# Patient Record
Sex: MALE | Race: WHITE | NOT HISPANIC OR LATINO | Employment: OTHER | URBAN - NONMETROPOLITAN AREA
[De-identification: names, ages, dates, MRNs, and addresses within clinical notes are randomized per-mention and may not be internally consistent; named-entity substitution may affect disease eponyms.]

---

## 2023-12-27 ENCOUNTER — OFFICE VISIT (OUTPATIENT)
Dept: URGENT CARE | Facility: CLINIC | Age: 78
End: 2023-12-27
Payer: OTHER GOVERNMENT

## 2023-12-27 VITALS
OXYGEN SATURATION: 97 % | HEIGHT: 69 IN | RESPIRATION RATE: 18 BRPM | HEART RATE: 93 BPM | SYSTOLIC BLOOD PRESSURE: 130 MMHG | DIASTOLIC BLOOD PRESSURE: 72 MMHG | WEIGHT: 225 LBS | TEMPERATURE: 97.2 F | BODY MASS INDEX: 33.33 KG/M2

## 2023-12-27 DIAGNOSIS — S61.412A LACERATION OF LEFT HAND WITHOUT FOREIGN BODY, INITIAL ENCOUNTER: Primary | ICD-10-CM

## 2023-12-27 PROCEDURE — 90715 TDAP VACCINE 7 YRS/> IM: CPT | Performed by: NURSE PRACTITIONER

## 2023-12-27 PROCEDURE — 99213 OFFICE O/P EST LOW 20 MIN: CPT | Mod: 25 | Performed by: NURSE PRACTITIONER

## 2023-12-27 RX ORDER — ATORVASTATIN CALCIUM 20 MG/1
20 TABLET, FILM COATED ORAL NIGHTLY
COMMUNITY

## 2023-12-27 RX ORDER — GLYBURIDE 5 MG/1
5 TABLET ORAL
COMMUNITY

## 2023-12-27 RX ORDER — ALLOPURINOL 300 MG/1
300 TABLET ORAL
COMMUNITY

## 2023-12-27 RX ORDER — ASPIRIN 81 MG/1
81 TABLET ORAL
COMMUNITY

## 2023-12-27 RX ORDER — LEVOTHYROXINE SODIUM 125 UG/1
125 TABLET ORAL
COMMUNITY

## 2023-12-27 RX ORDER — BENAZEPRIL HYDROCHLORIDE AND HYDROCHLOROTHIAZIDE 20; 12.5 MG/1; MG/1
1 TABLET ORAL
COMMUNITY

## 2023-12-27 NOTE — PROGRESS NOTES
78 y.o. male presents for evaluation of left hand laceration/skin tear that occurred last night when he tripped on carpet. Denies hitting head/loc, hand pain or any other associated symptoms. Is not up to date on tetanus. Did wash with water but no soap. Not on blood thinners.      Vitals:    12/27/23 0932   Resp: 18   SpO2: 97%       No Known Allergies    Medication Documentation Review Audit       Reviewed by Lisa Webster MA (Medical Assistant) on 12/27/23 at 0938      Medication Order Taking? Sig Documenting Provider Last Dose Status   allopurinol (Zyloprim) 300 mg tablet [id number]  Take 1 tablet (300 mg) by mouth once daily. Historical Provider, MD  Active   aspirin 81 mg EC tablet [id number]  Take 1 tablet (81 mg) by mouth once daily. Historical Provider, MD  Active   atorvastatin (Lipitor) 20 mg tablet [id number]  Take 1 tablet (20 mg) by mouth once daily at bedtime. Historical Provider, MD  Active   benazepriL-hydrochlorothiazide (Lotensin HCT) 20-12.5 mg tablet [id number]  Take 1 tablet by mouth once daily. Historical Provider, MD  Active   glyBURIDE (Diabeta) 5 mg tablet [id number]  Take 1 tablet (5 mg) by mouth. Historical Provider, MD  Active   levothyroxine (Synthroid, Levoxyl) 125 mcg tablet [id number]  Take 1 tablet (125 mcg) by mouth once daily. Historical Provider, MD  Active   lisinopril 10 mg tablet 10 mg, hydroCHLOROthiazide 12.5 mg tablet 12.5 mg [id number]  Take by mouth. Historical Provider, MD  Active   pioglitazone HCl (PIOGLITAZONE ORAL) [id number]  Take by mouth. Historical Provider, MD  Active                    No past medical history on file.    No past surgical history on file.    ROS  See HPI    Physical Exam  Vitals and nursing note reviewed.   Constitutional:       General: He is not in acute distress.     Appearance: Normal appearance. He is not ill-appearing or toxic-appearing.   Skin:     General: Skin is warm and dry.      Findings: Laceration (skin tear to dorsal aspect of left  hand) present.   Neurological:      General: No focal deficit present.      Mental Status: He is alert and oriented to person, place, and time.   Psychiatric:         Mood and Affect: Mood normal.         Behavior: Behavior normal.         Assessment/Plan/MDM  Murtaza was seen today for laceration.  Diagnoses and all orders for this visit:  Laceration of left hand without foreign body, initial encounter (Primary)  -     Tdap vaccine, age 10 years and older  (BOOSTRIX)    Wound cleansed with Betasept normal saline solution, 2 Steri-Strips applied and DSD with Adaptic applied. Advised pt to change dressing daily and leave open to air when resting. Boostrix given in office, pt tolerated well. Patient's clinical presentation is otherwise unremarkable at this time. Patient is discharged with instructions to follow-up with primary care or seek emergency medical attention for worsening symptoms or any new concerns.    Heriberto Marcus, CNP  [facility]  [phone]

## 2024-01-02 ENCOUNTER — TELEPHONE (OUTPATIENT)
Dept: URGENT CARE | Facility: CLINIC | Age: 79
End: 2024-01-02
Payer: OTHER GOVERNMENT

## 2024-01-02 DIAGNOSIS — L03.114 CELLULITIS OF LEFT UPPER EXTREMITY: Primary | ICD-10-CM

## 2024-01-02 RX ORDER — CEPHALEXIN 500 MG/1
500 CAPSULE ORAL 3 TIMES DAILY
Qty: 21 CAPSULE | Refills: 0 | Status: SHIPPED | OUTPATIENT
Start: 2024-01-02 | End: 2024-01-09

## 2024-01-02 NOTE — TELEPHONE ENCOUNTER
Pt returned to office for wound care follow up and noted mild erythema to edges of skin tear. Rx keflex sent to pharmacy. Advised pt to continue wound care as directed and monitor for worsening symptoms of infection.    Diagnoses and all orders for this visit:  Cellulitis of left upper extremity (Primary)  -     cephalexin (Keflex) 500 mg capsule; Take 1 capsule (500 mg) by mouth 3 times a day for 7 days.

## 2024-10-31 ENCOUNTER — HOSPITAL ENCOUNTER (EMERGENCY)
Facility: HOSPITAL | Age: 79
Discharge: HOME | End: 2024-10-31
Attending: EMERGENCY MEDICINE
Payer: OTHER GOVERNMENT

## 2024-10-31 ENCOUNTER — APPOINTMENT (OUTPATIENT)
Dept: VASCULAR MEDICINE | Facility: HOSPITAL | Age: 79
End: 2024-10-31
Payer: OTHER GOVERNMENT

## 2024-10-31 ENCOUNTER — PHARMACY VISIT (OUTPATIENT)
Dept: PHARMACY | Facility: CLINIC | Age: 79
End: 2024-10-31
Payer: COMMERCIAL

## 2024-10-31 VITALS
SYSTOLIC BLOOD PRESSURE: 130 MMHG | HEIGHT: 69 IN | TEMPERATURE: 97.8 F | WEIGHT: 190 LBS | OXYGEN SATURATION: 98 % | HEART RATE: 71 BPM | BODY MASS INDEX: 28.14 KG/M2 | RESPIRATION RATE: 16 BRPM | DIASTOLIC BLOOD PRESSURE: 56 MMHG

## 2024-10-31 DIAGNOSIS — R60.0 LOCALIZED EDEMA: ICD-10-CM

## 2024-10-31 DIAGNOSIS — I82.401 ACUTE DEEP VEIN THROMBOSIS (DVT) OF RIGHT LOWER EXTREMITY, UNSPECIFIED VEIN (MULTI): Primary | ICD-10-CM

## 2024-10-31 PROCEDURE — 93971 EXTREMITY STUDY: CPT

## 2024-10-31 PROCEDURE — RXMED WILLOW AMBULATORY MEDICATION CHARGE

## 2024-10-31 PROCEDURE — 99284 EMERGENCY DEPT VISIT MOD MDM: CPT | Mod: 25

## 2024-10-31 PROCEDURE — 93971 EXTREMITY STUDY: CPT | Performed by: STUDENT IN AN ORGANIZED HEALTH CARE EDUCATION/TRAINING PROGRAM

## 2024-10-31 RX ORDER — LISINOPRIL AND HYDROCHLOROTHIAZIDE 12.5; 2 MG/1; MG/1
1 TABLET ORAL
COMMUNITY
Start: 2024-04-09

## 2024-10-31 ASSESSMENT — COLUMBIA-SUICIDE SEVERITY RATING SCALE - C-SSRS
2. HAVE YOU ACTUALLY HAD ANY THOUGHTS OF KILLING YOURSELF?: NO
1. IN THE PAST MONTH, HAVE YOU WISHED YOU WERE DEAD OR WISHED YOU COULD GO TO SLEEP AND NOT WAKE UP?: NO
6. HAVE YOU EVER DONE ANYTHING, STARTED TO DO ANYTHING, OR PREPARED TO DO ANYTHING TO END YOUR LIFE?: NO

## 2024-10-31 ASSESSMENT — PAIN SCALES - GENERAL
PAINLEVEL_OUTOF10: 0 - NO PAIN
PAINLEVEL_OUTOF10: 0 - NO PAIN

## 2024-10-31 ASSESSMENT — PAIN - FUNCTIONAL ASSESSMENT: PAIN_FUNCTIONAL_ASSESSMENT: 0-10

## 2024-11-13 ENCOUNTER — APPOINTMENT (OUTPATIENT)
Dept: CARDIOLOGY | Facility: CLINIC | Age: 79
End: 2024-11-13
Payer: OTHER GOVERNMENT

## 2024-12-02 ENCOUNTER — TELEPHONE (OUTPATIENT)
Dept: GASTROENTEROLOGY | Facility: CLINIC | Age: 79
End: 2024-12-02
Payer: OTHER GOVERNMENT

## 2024-12-02 NOTE — TELEPHONE ENCOUNTER
12/2-Talked with Dr [facility] about [facility]. [facility] said his blood thinner can not be stopped at this time due to DVT. Dr Sanders agrees. Patient will have to hold off on EGD until he is cleared to be off of blood thinner. Called the [facility](Elsie Delgado) to inform them. She did not answer. I did leave a message on her voicemail. I also called patient to notify him as well. Scanned referral into chart.

## 2025-05-02 DIAGNOSIS — R13.10 DYSPHAGIA, UNSPECIFIED TYPE: ICD-10-CM

## 2025-05-20 ENCOUNTER — HOSPITAL ENCOUNTER (OUTPATIENT)
Dept: OPERATING ROOM | Facility: HOSPITAL | Age: 80
Discharge: HOME | End: 2025-05-20
Payer: OTHER GOVERNMENT

## 2025-05-20 VITALS
RESPIRATION RATE: 16 BRPM | OXYGEN SATURATION: 97 % | SYSTOLIC BLOOD PRESSURE: 142 MMHG | TEMPERATURE: 97.8 F | DIASTOLIC BLOOD PRESSURE: 70 MMHG | HEART RATE: 78 BPM

## 2025-05-20 DIAGNOSIS — Q39.6 ESOPHAGEAL DIVERTICULUM: Primary | ICD-10-CM

## 2025-05-20 DIAGNOSIS — K21.00 GASTROESOPHAGEAL REFLUX DISEASE WITH ESOPHAGITIS WITHOUT HEMORRHAGE: Primary | ICD-10-CM

## 2025-05-20 DIAGNOSIS — R13.10 DYSPHAGIA, UNSPECIFIED TYPE: ICD-10-CM

## 2025-05-20 LAB — GLUCOSE BLD MANUAL STRIP-MCNC: 62 MG/DL (ref 74–99)

## 2025-05-20 PROCEDURE — 3700000012 HC SEDATION LEVEL 5+ TIME - INITIAL 15 MINUTES 5/> YEARS

## 2025-05-20 PROCEDURE — 2500000004 HC RX 250 GENERAL PHARMACY W/ HCPCS (ALT 636 FOR OP/ED): Mod: JZ | Performed by: INTERNAL MEDICINE

## 2025-05-20 PROCEDURE — 43239 EGD BIOPSY SINGLE/MULTIPLE: CPT | Performed by: INTERNAL MEDICINE

## 2025-05-20 PROCEDURE — 3600000002 HC OR TIME - INITIAL BASE CHARGE - PROCEDURE LEVEL TWO

## 2025-05-20 PROCEDURE — 2500000005 HC RX 250 GENERAL PHARMACY W/O HCPCS: Performed by: INTERNAL MEDICINE

## 2025-05-20 PROCEDURE — 3600000007 HC OR TIME - EACH INCREMENTAL 1 MINUTE - PROCEDURE LEVEL TWO

## 2025-05-20 PROCEDURE — 7100000010 HC PHASE TWO TIME - EACH INCREMENTAL 1 MINUTE

## 2025-05-20 PROCEDURE — 82947 ASSAY GLUCOSE BLOOD QUANT: CPT

## 2025-05-20 PROCEDURE — 7100000009 HC PHASE TWO TIME - INITIAL BASE CHARGE

## 2025-05-20 RX ORDER — PANTOPRAZOLE SODIUM 40 MG/1
40 TABLET, DELAYED RELEASE ORAL DAILY
Qty: 90 TABLET | Refills: 3 | Status: SHIPPED | OUTPATIENT
Start: 2025-05-20

## 2025-05-20 RX ORDER — MIDAZOLAM HYDROCHLORIDE 1 MG/ML
INJECTION, SOLUTION INTRAMUSCULAR; INTRAVENOUS AS NEEDED
Status: COMPLETED | OUTPATIENT
Start: 2025-05-20 | End: 2025-05-20

## 2025-05-20 RX ORDER — MESALAMINE 0.38 G/1
1.5 CAPSULE, EXTENDED RELEASE ORAL DAILY
COMMUNITY

## 2025-05-20 RX ORDER — FENTANYL CITRATE 50 UG/ML
INJECTION, SOLUTION INTRAMUSCULAR; INTRAVENOUS AS NEEDED
Status: COMPLETED | OUTPATIENT
Start: 2025-05-20 | End: 2025-05-20

## 2025-05-20 RX ORDER — SODIUM CHLORIDE, SODIUM LACTATE, POTASSIUM CHLORIDE, CALCIUM CHLORIDE 600; 310; 30; 20 MG/100ML; MG/100ML; MG/100ML; MG/100ML
20 INJECTION, SOLUTION INTRAVENOUS CONTINUOUS
Status: DISCONTINUED | OUTPATIENT
Start: 2025-05-20 | End: 2025-05-21 | Stop reason: HOSPADM

## 2025-05-20 RX ADMIN — FENTANYL CITRATE 50 MCG: 50 INJECTION, SOLUTION INTRAMUSCULAR; INTRAVENOUS at 12:56

## 2025-05-20 RX ADMIN — MIDAZOLAM 2.5 MG: 1 INJECTION INTRAMUSCULAR; INTRAVENOUS at 12:56

## 2025-05-20 RX ADMIN — BENZOCAINE, BUTAMBEN, AND TETRACAINE HYDROCHLORIDE 2 SPRAY: .028; .004; .004 AEROSOL, SPRAY TOPICAL at 12:54

## 2025-05-20 RX ADMIN — MIDAZOLAM 2.5 MG: 1 INJECTION INTRAMUSCULAR; INTRAVENOUS at 12:59

## 2025-05-20 RX ADMIN — FENTANYL CITRATE 50 MCG: 50 INJECTION, SOLUTION INTRAMUSCULAR; INTRAVENOUS at 12:59

## 2025-05-20 RX ADMIN — SODIUM CHLORIDE, SODIUM LACTATE, POTASSIUM CHLORIDE, AND CALCIUM CHLORIDE 20 ML/HR: .6; .31; .03; .02 INJECTION, SOLUTION INTRAVENOUS at 12:34

## 2025-05-20 ASSESSMENT — PAIN - FUNCTIONAL ASSESSMENT
PAIN_FUNCTIONAL_ASSESSMENT: 0-10

## 2025-05-20 ASSESSMENT — PAIN SCALES - GENERAL
PAINLEVEL_OUTOF10: 0 - NO PAIN

## 2025-05-20 ASSESSMENT — COLUMBIA-SUICIDE SEVERITY RATING SCALE - C-SSRS
1. IN THE PAST MONTH, HAVE YOU WISHED YOU WERE DEAD OR WISHED YOU COULD GO TO SLEEP AND NOT WAKE UP?: NO
6. HAVE YOU EVER DONE ANYTHING, STARTED TO DO ANYTHING, OR PREPARED TO DO ANYTHING TO END YOUR LIFE?: NO
2. HAVE YOU ACTUALLY HAD ANY THOUGHTS OF KILLING YOURSELF?: NO

## 2025-05-20 NOTE — DISCHARGE INSTRUCTIONS

## 2025-05-20 NOTE — H&P
History Of Present Illness  Murtaza Cruz is a 79 y.o. male presenting with dysphagia presents for diagnostic EGD.     Past Medical History  Medical History[1]  Surgical History  Surgical History[2]  Social History  He reports that he has never smoked. He has never used smokeless tobacco. He reports current alcohol use. He reports that he does not use drugs.    Family History  Family History[3]     Allergies  Allergies[4]  Review of Systems  Pre-sedation Evaluation:  ASA Classification - ASA 2 - Patient with mild systemic disease with no functional limitations  Mallampati Score - II (hard and soft palate, upper portion of tonsils and uvula visible)    Physical Exam  Constitutional:       General: He is awake.      Appearance: Normal appearance.   HENT:      Head: Normocephalic and atraumatic.      Nose: Nose normal.      Mouth/Throat:      Mouth: Mucous membranes are moist.   Eyes:      Pupils: Pupils are equal, round, and reactive to light.   Neck:      Thyroid: No thyroid mass.      Trachea: Phonation normal.   Cardiovascular:      Rate and Rhythm: Normal rate and regular rhythm.      Heart sounds: Normal heart sounds.   Pulmonary:      Effort: Pulmonary effort is normal. No respiratory distress.      Breath sounds: Normal air entry. No decreased breath sounds, wheezing, rhonchi or rales.   Abdominal:      General: Bowel sounds are normal. There is no distension.      Palpations: Abdomen is soft.      Tenderness: There is no abdominal tenderness.   Musculoskeletal:      Cervical back: Neck supple.      Right lower leg: No edema.      Left lower leg: No edema.   Skin:     General: Skin is warm.      Capillary Refill: Capillary refill takes less than 2 seconds.   Neurological:      General: No focal deficit present.      Mental Status: He is alert and oriented to person, place, and time. Mental status is at baseline.      Cranial Nerves: Cranial nerves 2-12 are intact.      Motor: Motor function is intact.    Psychiatric:         Attention and Perception: Attention and perception normal.         Mood and Affect: Mood normal.         Speech: Speech normal.         Behavior: Behavior normal.          Last Recorded Vitals  There were no vitals taken for this visit.     Assessment/Plan   Problem List Items Addressed This Visit    None  Visit Diagnoses         Dysphagia, unspecified type        Relevant Orders    Esophagogastroduodenoscopy (EGD)              PTA/Current Medications:  Prescriptions Prior to Admission[5]  Current Medications[6]  Michael Sanders DO       [1]   Past Medical History:  Diagnosis Date    COPD (chronic obstructive pulmonary disease) (Multi)     Deep vein thrombosis (DVT) (Multi)     Gout     Hyperlipidemia     Hypertension     Hypothyroidism     Type 2 diabetes mellitus    [2]   Past Surgical History:  Procedure Laterality Date    CATARACT EXTRACTION EXTRACAPSULAR W/ INTRAOCULAR LENS IMPLANTATION Bilateral     OTHER SURGICAL HISTORY      submandibular gland removed    TRIGGER FINGER RELEASE     [3] No family history on file.  [4] No Known Allergies  [5] (Not in a hospital admission)  [6]   Current Outpatient Medications   Medication Sig Dispense Refill    allopurinol (Zyloprim) 300 mg tablet Take 1 tablet (300 mg) by mouth once daily.      apixaban (Eliquis) 5 mg (74 tabs) tablet Take 2 tablets (10 mg) by mouth 2 times a day for 7 days, then take 1 tablet (5 mg) by mouth 2 times a day. 74 tablet 0    atorvastatin (Lipitor) 20 mg tablet Take 1 tablet (20 mg) by mouth once daily at bedtime.      benazepriL-hydrochlorothiazide (Lotensin HCT) 20-12.5 mg tablet Take 1 tablet by mouth once daily.      glyBURIDE (Diabeta) 5 mg tablet Take 1 tablet (5 mg) by mouth.      levothyroxine (Synthroid, Levoxyl) 125 mcg tablet Take 1 tablet (125 mcg) by mouth once daily.      lisinopril 10 mg tablet 10 mg, hydroCHLOROthiazide 12.5 mg tablet 12.5 mg Take by mouth.      lisinopriL-hydrochlorothiazide 20-12.5 mg  tablet Take 1 tablet by mouth early in the morning..      pioglitazone HCl (PIOGLITAZONE ORAL) Take by mouth.       No current facility-administered medications for this encounter.

## 2025-05-30 ENCOUNTER — TELEPHONE (OUTPATIENT)
Dept: GASTROENTEROLOGY | Facility: CLINIC | Age: 80
End: 2025-05-30
Payer: OTHER GOVERNMENT

## 2025-05-30 NOTE — TELEPHONE ENCOUNTER
Called patient to inform him that the [facility] denied [facility] that was requested by Dr Sanders. Patient understood and will be reaching out to the [facility]. Canceled appt for CT and follow up with Dr Sanders at this time.

## 2025-06-03 LAB
LAB AP ASR DISCLAIMER: NORMAL
LABORATORY COMMENT REPORT: NORMAL
PATH REPORT.FINAL DX SPEC: NORMAL
PATH REPORT.GROSS SPEC: NORMAL
PATH REPORT.RELEVANT HX SPEC: NORMAL
PATH REPORT.TOTAL CANCER: NORMAL

## 2025-06-05 ENCOUNTER — APPOINTMENT (OUTPATIENT)
Dept: RADIOLOGY | Facility: HOSPITAL | Age: 80
End: 2025-06-05
Payer: OTHER GOVERNMENT

## 2025-06-12 ENCOUNTER — APPOINTMENT (OUTPATIENT)
Dept: GASTROENTEROLOGY | Facility: CLINIC | Age: 80
End: 2025-06-12
Payer: OTHER GOVERNMENT

## 2025-07-08 ENCOUNTER — HOSPITAL ENCOUNTER (OUTPATIENT)
Dept: RADIOLOGY | Facility: HOSPITAL | Age: 80
End: 2025-07-08
Payer: OTHER GOVERNMENT

## 2025-07-10 ENCOUNTER — APPOINTMENT (OUTPATIENT)
Dept: RADIOLOGY | Facility: HOSPITAL | Age: 80
End: 2025-07-10
Payer: OTHER GOVERNMENT

## 2025-07-10 ENCOUNTER — HOSPITAL ENCOUNTER (OUTPATIENT)
Dept: RADIOLOGY | Facility: HOSPITAL | Age: 80
Discharge: HOME | End: 2025-07-10
Payer: OTHER GOVERNMENT

## 2025-07-10 DIAGNOSIS — R13.10 DYSPHAGIA, UNSPECIFIED: ICD-10-CM

## 2025-07-10 PROCEDURE — 71250 CT THORAX DX C-: CPT

## 2025-07-10 PROCEDURE — 71250 CT THORAX DX C-: CPT | Performed by: STUDENT IN AN ORGANIZED HEALTH CARE EDUCATION/TRAINING PROGRAM

## 2025-07-10 PROCEDURE — 70490 CT SOFT TISSUE NECK W/O DYE: CPT

## 2025-08-01 ENCOUNTER — HOSPITAL ENCOUNTER (OUTPATIENT)
Dept: RADIOLOGY | Facility: HOSPITAL | Age: 80
Discharge: HOME | End: 2025-08-01
Payer: OTHER GOVERNMENT

## 2025-08-01 DIAGNOSIS — R13.14 DYSPHAGIA, PHARYNGOESOPHAGEAL PHASE: ICD-10-CM

## 2025-08-01 PROCEDURE — 92611 MOTION FLUOROSCOPY/SWALLOW: CPT | Mod: GN | Performed by: SPEECH-LANGUAGE PATHOLOGIST

## 2025-08-01 PROCEDURE — 74230 X-RAY XM SWLNG FUNCJ C+: CPT

## 2025-08-01 PROCEDURE — 2500000005 HC RX 250 GENERAL PHARMACY W/O HCPCS: Performed by: OTOLARYNGOLOGY

## 2025-08-01 RX ADMIN — BARIUM SULFATE 20 ML: 0.81 POWDER, FOR SUSPENSION ORAL at 08:55

## 2025-08-01 RX ADMIN — BARIUM SULFATE 5 ML: 400 PASTE ORAL at 08:56

## 2025-08-01 RX ADMIN — BARIUM SULFATE 45 ML: 400 SUSPENSION ORAL at 08:55

## 2025-08-01 NOTE — ADDENDUM NOTE
Encounter addended by: Nano Deras, SLP on: 8/1/2025 2:24 PM   Actions taken: [facility]mie clinical note, Charge Capture section accepted

## 2025-08-01 NOTE — PROCEDURES
Speech-Language Pathology    Adult Outpatient Modified Barium Swallow Study    Patient Name: Murtaza Cruz  MRN: [id number]  : 1945  Today's Date: 25    Time Calculation  Start Time: 0830  Stop Time: 0850  Total Time (min): 20 minutes     Modified Barium Swallow Study completed. Informed verbal consent obtained prior to completion of exam. The study was completed per protocol with various liquid barium consistencies, and pudding. A 1.9 cm or .75 inch (outer diameter) ring was placed on the chin in the lateral view and on the lateral, in order to complete objective measurements during swallowing. The anatomic structures and function of the oropharynx, larynx, hypopharynx and cervical esophagus were evaluated.    SLP: SANJUANA Person-SLP supervised by Nano Luevano MA, CCC-SLP  Contact info: [employer] secure chat      Reason for Referral: pharyngoesophageal dysphagia  Patient Hx: 3.3 cm diverticulum in cervical esophagus, GERD, recent PNA, 4mm lung nodules, COPD, DVT, gout, HLD, HTN, hypothyroidism, DM II  Respiratory Status: Room air  Current diet: Regular textures (IDDSI level 7) and Thin liquids (IDDSI level 0)    Pain:  Pain Scale: 0-10  Ratin      Fall Risk: 0      Based on the results of the MBSS, the following diet is recommended with the implementation of the specified safe swallow strategies:    DIET RECOMMENDATION   Solids: Unable to determine solid diet recommendation 2/2 termination of study following identification of increased aspiration risk  Liquids: Thin liquids (IDDSI level 0) - Free Water Protocol    STRATEGIES:  - Small bites  - Small, single sips  - Alternate consistencies  - Effortful swallow  - Upright for all PO intake  - Swallow 2-3 times per bite/sip  - Complete oral care frequently throughout the day      Plan:  SLP Plan: Skilled SLP warranted following surgical intervention for esophageal diverticulum    Discussed POC: Patient  Discussed Risks/Benefits:  Yes  Patient/Caregiver Agreeable: Yes    Dysphagia Goal(s):  Murtaza will consume the least restrictive diet (LRD) with no overt s/s of airway compromise in 90% of trials independently in order to maintain adequate nutrition and hydration.     STATUS: Goal established   Goal Start: 8/1/2025  Anticipated End: 2/1/2026  Goal End:       Following surgical intervention...  Murtaza will complete a Modified Barium Swallow Study to re-assess oral and pharyngeal phases of swallowing and risk of aspiration.    STATUS: Goal established    Progress: TBD   Goal Start: 8/1/2025  Anticipated End: TBD   Goal End:         Patient's Goal for Therapy: Have diverticulum surgically repaired    Education Provided: Results and recommendations per MBSS. Verbal understanding and agreement given.    Treatment Provided Today: ST provided extensive education and training to patient and family regarding anatomy/physiology of swallow function, risk factors of aspiration/aspiration pneumonia and how to mitigate factors, diet modifications, and the use of compensatory swallow strategies to promote patient safety upon PO intake including multiple swallows, free water protocol, alternating solids and liquids, supraglottic swallow, and being cautious during PO intake.     Additional Medical Consults Suggested:   - ENT  - GI  - Nutrition  - Surgical intervention team      Repeat study/dc plan: Following surgical repair of esophageal diverticulum.       Mechanics of the Swallow Summary:  ORAL PHASE:  Lip Closure - Interlabial escape/no progression to anterior lip   Tongue Control During Bolus Hold - Escape to lateral buccal cavity and/or floor of mouth   Bolus prep/mastication - Mastication not assessed   Bolus transport/lingual motion - Repetitive/disorganized tongue motion (tongue pumping)   Oral residue - Trace residue lining oral structures     PHARYNGEAL PHASE:  Initiation of pharyngeal swallow - Bolus head at vallecular pit   Soft palate  elevation - Trace column of contrast or air between soft palate and pharyngeal wall   Laryngeal elevation - Partial superior movement of thyroid cartilage and/or partial approximation of arytenoids to epiglottic petiole   Anterior hyoid excursion - Partial anterior movement   Epiglottic movement - Partial inversion  Laryngeal vestibule closure - Incomplete - narrow column of air/contrast in laryngeal vestibule   Pharyngeal stripping wave - Present, however, diminished   Pharyngeal contraction (A/P view) - Complete  Pharyngoesophageal segment opening - Minimal distension/incomplete duration with marked obstruction of flow of bolus   Tongue base retraction - Trace column of contrast or air between tongue base and pharyngeal wall   Pharyngeal residue - Majority of contrast within or on the pharyngeal structures     ESOPHAGEAL PHASE:  Esophageal clearance - Minimal to no esophageal clearance       SLP Impressions with Severity Rating:   Pt presents with severe to profound pharyngoesophageal dysphagia upon completion of modified barium swallow study this date. Swallowing physiology is detailed above.     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     Patient demonstrated aspiration of thin and mildly thickened liquids and poor transportation of bolus through the pharynx and esophagus.      Patient demonstrated trace oral and severe pharyngeal residue that was minimally reduced.    Strategies attempted:  -Multiple swallows  -Supraglottic swallow      OUTCOME MEASURES:  Functional Oral Intake Scale  Functional Oral Intake Scale: Level 5        total oral diet with multiple consistencies, but requires special preparations and compensations    Consider alternate means to maximize nutrition with decreased risk to health and  safety.        Rosenbek's Penetration Aspiration Scale  Thin Liquids: 7. OVERT ASPIRATION, material is not cleared - contrast passes glottis, visible residue, W/pt response  After the Swallow  Nectar Thick Liquids: 6. ASPIRATION that CLEARS with the swallow - contrast passes glottis, no subglottic residue  After the Swallow  Puree: 1. NO ASPIRATION & NO PENETRATION - no aspiration, contrast does not enter airway